# Patient Record
Sex: FEMALE | Race: OTHER
[De-identification: names, ages, dates, MRNs, and addresses within clinical notes are randomized per-mention and may not be internally consistent; named-entity substitution may affect disease eponyms.]

---

## 2019-02-20 ENCOUNTER — HOSPITAL ENCOUNTER (EMERGENCY)
Dept: HOSPITAL 62 - ER | Age: 24
Discharge: HOME | End: 2019-02-20
Payer: OTHER GOVERNMENT

## 2019-02-20 VITALS — DIASTOLIC BLOOD PRESSURE: 93 MMHG | SYSTOLIC BLOOD PRESSURE: 134 MMHG

## 2019-02-20 DIAGNOSIS — R11.0: ICD-10-CM

## 2019-02-20 DIAGNOSIS — F41.9: Primary | ICD-10-CM

## 2019-02-20 DIAGNOSIS — R00.2: ICD-10-CM

## 2019-02-20 DIAGNOSIS — R07.89: ICD-10-CM

## 2019-02-20 DIAGNOSIS — F41.0: ICD-10-CM

## 2019-02-20 DIAGNOSIS — Z79.899: ICD-10-CM

## 2019-02-20 DIAGNOSIS — Z79.3: ICD-10-CM

## 2019-02-20 DIAGNOSIS — F32.9: ICD-10-CM

## 2019-02-20 LAB
ADD MANUAL DIFF: NO
ALBUMIN SERPL-MCNC: 5.2 G/DL (ref 3.5–5)
ALP SERPL-CCNC: 85 U/L (ref 38–126)
ALT SERPL-CCNC: < 6 U/L (ref 9–52)
ANION GAP SERPL CALC-SCNC: 14 MMOL/L (ref 5–19)
AST SERPL-CCNC: 19 U/L (ref 14–36)
BASOPHILS # BLD AUTO: 0 10^3/UL (ref 0–0.2)
BASOPHILS NFR BLD AUTO: 0.6 % (ref 0–2)
BILIRUB DIRECT SERPL-MCNC: 0.3 MG/DL (ref 0–0.4)
BILIRUB SERPL-MCNC: 0.8 MG/DL (ref 0.2–1.3)
BUN SERPL-MCNC: 13 MG/DL (ref 7–20)
CALCIUM: 10.6 MG/DL (ref 8.4–10.2)
CHLORIDE SERPL-SCNC: 105 MMOL/L (ref 98–107)
CK MB SERPL-MCNC: 0.25 NG/ML (ref ?–4.55)
CO2 SERPL-SCNC: 25 MMOL/L (ref 22–30)
EOSINOPHIL # BLD AUTO: 0.1 10^3/UL (ref 0–0.6)
EOSINOPHIL NFR BLD AUTO: 1.6 % (ref 0–6)
ERYTHROCYTE [DISTWIDTH] IN BLOOD BY AUTOMATED COUNT: 13.1 % (ref 11.5–14)
GLUCOSE SERPL-MCNC: 90 MG/DL (ref 75–110)
HCT VFR BLD CALC: 44 % (ref 36–47)
HGB BLD-MCNC: 15.2 G/DL (ref 12–15.5)
LYMPHOCYTES # BLD AUTO: 1.8 10^3/UL (ref 0.5–4.7)
LYMPHOCYTES NFR BLD AUTO: 29.2 % (ref 13–45)
MCH RBC QN AUTO: 30.8 PG (ref 27–33.4)
MCHC RBC AUTO-ENTMCNC: 34.5 G/DL (ref 32–36)
MCV RBC AUTO: 89 FL (ref 80–97)
MONOCYTES # BLD AUTO: 0.3 10^3/UL (ref 0.1–1.4)
MONOCYTES NFR BLD AUTO: 5.7 % (ref 3–13)
NEUTROPHILS # BLD AUTO: 3.8 10^3/UL (ref 1.7–8.2)
NEUTS SEG NFR BLD AUTO: 62.9 % (ref 42–78)
PLATELET # BLD: 369 10^3/UL (ref 150–450)
POTASSIUM SERPL-SCNC: 5.2 MMOL/L (ref 3.6–5)
PROT SERPL-MCNC: 8.7 G/DL (ref 6.3–8.2)
RBC # BLD AUTO: 4.94 10^6/UL (ref 3.72–5.28)
SODIUM SERPL-SCNC: 144.3 MMOL/L (ref 137–145)
TOTAL CELLS COUNTED % (AUTO): 100 %
TROPONIN I SERPL-MCNC: < 0.012 NG/ML
WBC # BLD AUTO: 6.1 10^3/UL (ref 4–10.5)

## 2019-02-20 PROCEDURE — 93005 ELECTROCARDIOGRAM TRACING: CPT

## 2019-02-20 PROCEDURE — 80053 COMPREHEN METABOLIC PANEL: CPT

## 2019-02-20 PROCEDURE — 82553 CREATINE MB FRACTION: CPT

## 2019-02-20 PROCEDURE — 84703 CHORIONIC GONADOTROPIN ASSAY: CPT

## 2019-02-20 PROCEDURE — 85025 COMPLETE CBC W/AUTO DIFF WBC: CPT

## 2019-02-20 PROCEDURE — 71046 X-RAY EXAM CHEST 2 VIEWS: CPT

## 2019-02-20 PROCEDURE — 36415 COLL VENOUS BLD VENIPUNCTURE: CPT

## 2019-02-20 PROCEDURE — 93010 ELECTROCARDIOGRAM REPORT: CPT

## 2019-02-20 PROCEDURE — 84484 ASSAY OF TROPONIN QUANT: CPT

## 2019-02-20 PROCEDURE — 85379 FIBRIN DEGRADATION QUANT: CPT

## 2019-02-20 PROCEDURE — 99285 EMERGENCY DEPT VISIT HI MDM: CPT

## 2019-02-20 NOTE — EKG REPORT
SEVERITY:- BORDERLINE ECG -

SINUS RHYTHM

EARLY PRECORDIAL  TRANSITION

:

Confirmed by: Ovidio Agrawal MD 20-Feb-2019 18:38:37

## 2019-02-20 NOTE — RADIOLOGY REPORT (SQ)
EXAM DESCRIPTION:  CHEST 2 VIEWS



COMPLETED DATE/TIME:  2/20/2019 3:07 pm



REASON FOR STUDY:  chest pain



COMPARISON:  None.



EXAM PARAMETERS:  NUMBER OF VIEWS: two views

TECHNIQUE: Digital Frontal and Lateral radiographic views of the chest acquired.

RADIATION DOSE: NA

LIMITATIONS: none



FINDINGS:  LUNGS AND PLEURA: No opacities, masses or pneumothorax. No pleural effusion.

MEDIASTINUM AND HILAR STRUCTURES: No masses or contour abnormalities.

HEART AND VASCULAR STRUCTURES: Heart normal size.  No evidence for failure.

BONES:  Mild dextroconvex scoliosis of the thoracic spine.

HARDWARE: None in the chest.

OTHER: No other significant finding.



IMPRESSION:  1. NO ACUTE RADIOGRAPHIC FINDING IN THE CHEST.



TECHNICAL DOCUMENTATION:  JOB ID:  [id number]

 2011 [employer]- All Rights Reserved



Reading location - IP/workstation name: [email or web address]

## 2019-02-20 NOTE — ER DOCUMENT REPORT
ED General





- General


Chief Complaint: Palpitations


Stated Complaint: CHEST PAIN


Time Seen by Provider: 02/20/19 14:20


Notes: 





Patient is experiencing chest pains that began last night.  She is also having a

hard time breathing.  Feels like she cannot catch her breath.  She has been 

noticing her heart "fluttering" and palpitations.  Has been nauseated but not 

vomited.  No cough or chest congestion.  Has had a headache for the past couple 

of days.  No fevers.





Patient is on Depakote for birth control.  Does not have menstrual cycles.





She sees a local psychiatrist for the last 6 months being treated for anxiety 

and depression and panic attacks.  She is currently on BuSpar 10 mg 3 times a 

day and Wellbutrin 100 mg daily.  She asked her psychiatrist if she should 

adjust her medications and he referred her to the emergency room to make sure 

she did not have a medical reason for her symptoms.





History of appendectomy.





TRAVEL OUTSIDE OF THE [place] IN LAST 30 DAYS: No





- Related Data


Allergies/Adverse Reactions: 


                                        





No Known Allergies Allergy (Verified 02/20/19 13:19)


   











Past Medical History





- Social History


Smoking Status: Never Smoker


Chew tobacco use (# tins/day): No


Frequency of alcohol use: None


Drug Abuse: None


Family History: Reviewed & Not Pertinent


Patient has suicidal ideation: No


Patient has homicidal ideation: No


Psychiatric Medical History: Reports: Hx Anxiety, Hx Depression, Other - Panic 

attacks


Past Surgical History: Reports: Hx Appendectomy





Review of Systems





- Review of Systems


Notes: 





REVIEW OF SYSTEMS:





CONSTITUTIONAL :  Denies fever.


  


EENT:   Denies eye, ear, nose or mouth or throat pain or other symptoms.





CARDIOVASCULAR: See HPI.





RESPIRATORY:  Denies cough, chest congestion, or shortness of breath.





GASTROINTESTINAL:  Denies abdominal pain or vomiting, or diarrhea.  Nauseated.





GENITOURINARY:  Denies difficulty or painful urinating, urinary frequency, blood

in urine.





MUSCULOSKELETAL:  Denies back or neck pain.  Denies joint pain or swelling.  No 

extremity swelling or pain or anything that looks like blood clots.





SKIN:   Denies rash or skin lesions.





NEUROLOGICAL:  Denies LOC or altered mental status.  Has had some headache.  

Denies sensory loss or motor deficits.





ALL OTHER SYSTEMS REVIEWED AND NEGATIVE.





Physical Exam





- Vital signs


Vitals: 


                                        











Temp Pulse Resp BP Pulse Ox


 


 98.8 F   77   16   126/75 H  98 


 


 02/20/19 13:29  02/20/19 13:29  02/20/19 13:29  02/20/19 13:29  02/20/19 13:29











Interpretation: Normal.  No: Tachycardic, Hypoxic


Notes: 





PHYSICAL EXAMINATION:





GENERAL: Well-appearing, in no acute distress.  Frequently takes a deep 

inspiration and sighing expiration.  Says she is just been doing that for the 

past couple of days.





HEAD: Atraumatic, normocephalic.





EYES: Pupils equal round and reactive to light, extraocular movements intact.





ENT: oropharynx clear without exudates.  Moist mucous membranes.





NECK: Normal range of motion, supple.





LUNGS: Breath sounds clear and equal bilaterally.  No tachypnea or hypoxia.





HEART: Regular rate and rhythm without murmurs.  No tachycardia.





ABDOMEN: Soft, nontender.  No guarding or rebound.  No masses.





BACK:  No tenderness throughout entire back.





EXTREMITIES: Normal range of motion without pain.  Negative Homans bilaterally.





NEUROLOGICAL:  Normal speech, normal gait.  Normal sensory, motor, and reflex 

exams.  Awake, alert, and oriented x3.  Cranial nerves normal.





PSYCH: Normal mood, normal affect.





SKIN: Warm, dry, no rashes.





Course





- Re-evaluation


Re-evalutation: 





02/20/19 18:26


Patient's entire medical workup was normal.  Her CBC, comp 12 chemistries, even 

a d-dimer study were all normal.





Patient expressed appreciation of the workup because that is what her doctor 

wanted to do is to make sure she did not have something medically wrong before 

they considered any changes in her current treatment regimen.








- Vital Signs


Vital signs: 


                                        











Temp Pulse Resp BP Pulse Ox


 


 98.8 F   77   19   134/93 H  99 


 


 02/20/19 13:29  02/20/19 13:29  02/20/19 18:01  02/20/19 18:01  02/20/19 18:01














- Laboratory


Result Diagrams: 


                                 02/20/19 14:15





                                 02/20/19 14:15


Laboratory results interpreted by me: 


                                        











  02/20/19





  14:15


 


Potassium  5.2 H


 


Calcium  10.6 H


 


ALT  < 6 L


 


Total Protein  8.7 H


 


Albumin  5.2 H














Discharge





- Discharge


Clinical Impression: 


 Chest pain, non-cardiac, Anxiety





Disposition: HOME, SELF-CARE


Additional Instructions: 


CHEST PAIN OF UNCLEAR CAUSE:





     The exact cause of your chest pain isn't clear.  Fortunately, there is no 

evidence of a dangerous medical condition.  Further testing may be required to 

find the source of the pain.


     Most often, we find that this pain is coming from the chest wall -- the 

muscles or rib joints in the chest.  But chest pain can come from the lung and 

lung lining, the esophagus, the heart valves or heart lining, and even the 

stomach or gallbladder.


     Rest.  Eat lightly until the pain is gone.  We may prescribe medicine for 

pain and inflammation.


     You should call the physician immediately if the pain radiates to the 

shoulder, jaw or arms; if you start to run a fever or develop a cough; or if you

develop shortness of breath, or other new or alarming symptoms.








NORMAL EXAM AND WORKUP:


     At this time, your examination and workup show no significant abnormality. 

No significant abnormal physical findings were noted.  All laboratory, EKG, and 

imaging (x-ray, CT scans, ultrasound) studies that were ordered show no sign

ificant abnormality.


     Although your examination and all studies that were ordered showed no sign

ificant abnormal finding, there are no examinations and no studies that are 100%

accurate.  There is always the possibility that some abnormality could exist and

not be detected with physical examination or within the limits and capabilities 

of laboratory and other studies.


     You should return or follow up as you were instructed on your visit today 

for further evaluation if your symptoms do not resolve.





Anxiety


     The physician feels that some of your health problems are being caused by 

anxiety.  Anxiety affects your health in many ways.  Anxiety alone can cause 

palpitations, sweats, chest pains, abdominal pains, shortness of breath, and 

headaches.  It contributes to ulcer disease, high blood pressure, irritable 

bowel syndrome, and has been shown to cause flare-ups of many other diseases.


     Anxiety is not a simple disorder to treat.  If the anxiety is due to recent

life stresses, you may simply need time to "work through" the changes.  If the 

anxiety is due to an underlying unhappiness with yourself or due to psychiatric 

disturbance, professional help will be needed.  Your physician can refer you for

further help if needed.


     Anti-anxiety medication is occasionally given if the stress is acute or if 

you are having trouble sleeping.  Chronic or frequent use of these medications 

is not a good idea because the body becomes reliant on it, preventing you from 

dealing with life's normal stresses.








FOLLOW-UP CARE:


If you have been referred to a physician for follow-up care, call the 

physicians office for an appointment as you were instructed or within the next 

two days.  If you experience worsening or a significant change in your symptoms,

notify the physician immediately or return to the Emergency Department at any 

time for re-evaluation.





Follow-up with your doctor if symptoms continue and to consider any change in 

your medications.